# Patient Record
Sex: FEMALE | Race: BLACK OR AFRICAN AMERICAN | Employment: FULL TIME | URBAN - METROPOLITAN AREA
[De-identification: names, ages, dates, MRNs, and addresses within clinical notes are randomized per-mention and may not be internally consistent; named-entity substitution may affect disease eponyms.]

---

## 2021-11-01 ENCOUNTER — OFFICE VISIT (OUTPATIENT)
Dept: SURGERY | Age: 46
End: 2021-11-01
Payer: COMMERCIAL

## 2021-11-01 VITALS
HEART RATE: 76 BPM | BODY MASS INDEX: 28.77 KG/M2 | SYSTOLIC BLOOD PRESSURE: 102 MMHG | OXYGEN SATURATION: 100 % | RESPIRATION RATE: 16 BRPM | HEIGHT: 63 IN | DIASTOLIC BLOOD PRESSURE: 68 MMHG | TEMPERATURE: 97.1 F | WEIGHT: 162.4 LBS

## 2021-11-01 DIAGNOSIS — N62 MACROMASTIA: Primary | ICD-10-CM

## 2021-11-01 PROCEDURE — 99203 OFFICE O/P NEW LOW 30 MIN: CPT | Performed by: PHYSICIAN ASSISTANT

## 2021-11-01 NOTE — PROGRESS NOTES
Department of Plastic Surgery - Adult  Attending Consult Note        CHIEF COMPLAINT:  Symptomatic Macromastia    History Obtained From:  patient    HISTORY OF PRESENT ILLNESS:                The patient is a 42 y.o. female who presents with bilateral symptomatic macromastia. The patient complains of back pain, neck pain, shoulder pain, shoulder grooving, and intertrigo , Intermittent numbness and tingling in bilateral hands and arms. She does admit to having increased headaches over the past 48 months which she associated with her breast symptomatolgy. The patient states that she has been dealing with these associated symptoms , for over 10 years. She is a currently a size 38DDD cup. She has undergone a trial of weight loss, NSAIDS, specialty bras, She states that she has done conservative therapy for  over 12 months. She also admits to feeling as though she has a hunched back from the pull and strain of her breast.  She states that her weight does fluctuate, but to no discernable change in her breast symptomology. She states she has been at a stable weight for greater than 6 months. She is  not a diabetic. The pt states the weight and size of her breasts are effecting her ADLS. She currently works as  for [employer] . The patient states that the weight and size and pull of her breast limits her ability to work to her fullest potential.  She wwalks 2-10 miles every day and she states her breasts limit how much she can work without pain. She states she also is limited to maintaining good hygiene to her bilateral breast inframammary folds, and this has been effecting her for many years. She does use OTC powders and creams for her bilateral breast inframmamary fold rashes, but she states none of these have helped aid in her symptoms. She does have refractory symtoms after using powders and ointments. The pt does  a self breast exam frequently.    The patient  denies any family history of breast cancer. The patient denies any personal history of breast disease. Past Medical History:    Past Medical History:   Diagnosis Date    Known health problems: none     none per pt     Past Surgical History:    Past Surgical History:   Procedure Laterality Date     SECTION       Current Medications:      No current outpatient medications on file. No current facility-administered medications for this visit. Allergies:  Patient has no known allergies. Social History:   Social History     Socioeconomic History    Marital status:      Spouse name: Not on file    Number of children: Not on file    Years of education: Not on file    Highest education level: Not on file   Occupational History    Not on file   Tobacco Use    Smoking status: Never Smoker    Smokeless tobacco: Never Used   Vaping Use    Vaping Use: Never used   Substance and Sexual Activity    Alcohol use: Never    Drug use: Never    Sexual activity: Not on file   Other Topics Concern    Not on file   Social History Narrative    Not on file     Social Determinants of Health     Financial Resource Strain:     Difficulty of Paying Living Expenses:    Food Insecurity:     Worried About Running Out of Food in the Last Year:     [address] in the Last Year:    Transportation Needs:     Lack of Transportation (Medical):      Lack of Transportation (Non-Medical):    Physical Activity:     Days of Exercise per Week:     Minutes of Exercise per Session:    Stress:     Feeling of Stress :    Social Connections:     Frequency of Communication with Friends and Family:     Frequency of Social Gatherings with Friends and Family:     Attends Evangelical Services:     Active Member of Clubs or Organizations:     Attends Club or Organization Meetings:     Marital Status:    Intimate Partner Violence:     Fear of Current or Ex-Partner:     Emotionally Abused:     Physically Abused:     Sexually Abused:      Family History:   Family History   Problem Relation Age of Onset    No Known Problems Mother     No Known Problems Father        REVIEW OF SYSTEMS:    CONSTITUTIONAL:  negative for  fevers, chills, sweats and fatigue  EYES: negative for dipolpia or acute vision loss. RESPIRATORY:  negative for  dry cough, cough with sputum, dyspnea, wheezing and chest pain  CARDIOVASCULAR:  negative for  chest pain, dyspnea, palpitations, syncope  GASTROINTESTINAL:  negative for nausea, vomiting, change in bowel habits, diarrhea, constipation and abdominal pain  EXTREMITIES: negative for edema  MUSCULOSKELETAL: negative for muscle weakness  SKIN: negative for itching or rashes. BEHAVIOR/PSYCH:  negative for poor appetite, increased appetite, decreased sleep and poor concentration  All other review of systems negative    PHYSICAL EXAM:    VITALS:  /68 (Site: Left Upper Arm, Position: Sitting, Cuff Size: Medium Adult)   Pulse 76   Temp 97.1 °F (36.2 °C) (Skin)   Resp 16   Ht 5' 3\" (1.6 m)   Wt 162 lb 6.4 oz (73.7 kg)   SpO2 100%   Breastfeeding No Comment: mirena  BMI 28.77 kg/m²   CONSTITUTIONAL:  awake, alert, cooperative, no apparent distress, and appears stated age  EYES: PERRLA, EOMI, no signs of occular infection  LUNGS:  No increased work of breathing, good air exchange,   CARDIOVASCULAR:   regular rate and rhythm, EXTREMITIES: no signs of clubbing or cyanosis. MUSCULOSKELETAL: negative for flaccid muscle tone or spastic movements. SKIN: gross examination reveals no signs of rashes, or diaphoresis. NEURO: Cranial nerves II-XII grossly intact. No signs of agitated mood. BREAST: Rash is not noted, There are no masses palpated to b/l breast, no axillary lymphadenopathy, no nipple discharge. No breast pain. There are no previous scars.   Bilateral Nipple sensation  intact, lateral inframammary fold hyperhidrosis is appreciated, there is an appearance of hunched shoulders and upper back second to the pool and strain of the patient's large breasts. DATA:    Radiology Review:  Pending/  Breast Measurements were taken and are noted in the media section. IMPRESSION/RECOMMENDATIONS:      Diagnosis  -) Bilateral Macromastia  -) Bilateral Inframammary fold Pruritis  -) Neck Pain/Back Pain    -Breast Measurements obtained and will be scanned into media. Photos were obtained. Chaperone present  -Based on the patients history, ROS, and PE, It is in my medical opinion the patients symptomology is directly correlated to the size, mass and weight of her bilateralbreast tissue. Because this patient has tried and failed conservative treatment for the relief of her symptoms, The patient would benefit from a bilateral breast reduction.     - We will have the patient arrange a release of medical records from her PCP to obtain clinical history.  - Educated the patient on performing self breast exams as patient states she does not. - Discussed with the patient on healthy diet and exercise for continued BMI reduction.   - Screening Mammogram will need reviewed as she had this done last week. Proposed 900 gram removal of the Left breast  Proposed 900 gram removal of the Right breast    The perioperative course was discussed with the patient. The risks and benefits, including but not limited to, bleeding, infection, pain, scarring, asymmetry, loss of tissue, including nipple and areola, unsatisfactory appearance, decreased or increased nipple sensation, poor healing, inability to lactate as well as the need for further surgery were discussed with the patient. She would like to proceed. The risks, benefits and options were discussed with the pt. The risks included but not limited to pain, bleeding, infection, heavy scarring, damage to surrounding structures,  and need for further procedures.  Other risks including but not limited to asymmetry, loss of nipple or/and areola, loss of sensation to nipple and areola, inability to breast feed, seroma, hematoma, implant failure, capsular contracture, and fat necrosis were also discussed with the patient. All of her questions were answered to her satisfaction and she agrees to proceed with the operation.       Follow-up after completion of prior authorization  Lynette Fischer

## 2021-12-27 ENCOUNTER — PREP FOR PROCEDURE (OUTPATIENT)
Dept: SURGERY | Age: 46
End: 2021-12-27

## 2021-12-27 RX ORDER — SODIUM CHLORIDE 0.9 % (FLUSH) 0.9 %
5-40 SYRINGE (ML) INJECTION EVERY 12 HOURS SCHEDULED
Status: CANCELLED | OUTPATIENT
Start: 2021-12-27

## 2021-12-27 RX ORDER — SODIUM CHLORIDE 0.9 % (FLUSH) 0.9 %
5-40 SYRINGE (ML) INJECTION PRN
Status: CANCELLED | OUTPATIENT
Start: 2021-12-27

## 2021-12-27 RX ORDER — SODIUM CHLORIDE 9 MG/ML
25 INJECTION, SOLUTION INTRAVENOUS PRN
Status: CANCELLED | OUTPATIENT
Start: 2021-12-27

## 2021-12-27 NOTE — H&P
Department of Plastic Surgery - Adult  Attending Consult Note           CHIEF COMPLAINT:  Symptomatic Macromastia     History Obtained From:  patient     HISTORY OF PRESENT ILLNESS:                 The patient is a 42 y.o. female who presents with bilateral symptomatic macromastia. The patient complains of back pain, neck pain, shoulder pain, shoulder grooving, and intertrigo , Intermittent numbness and tingling in bilateral hands and arms. She does admit to having increased headaches over the past 48 months which she associated with her breast symptomatolgy. The patient states that she has been dealing with these associated symptoms , for over 10 years. She is a currently a size 38DDD cup. She has undergone a trial of weight loss, NSAIDS, specialty bras, She states that she has done conservative therapy for  over 12 months. She also admits to feeling as though she has a hunched back from the pull and strain of her breast.  She states that her weight does fluctuate, but to no discernable change in her breast symptomology. She states she has been at a stable weight for greater than 6 months. She is  not a diabetic. The pt states the weight and size of her breasts are effecting her ADLS. She currently works as  for [employer] . The patient states that the weight and size and pull of her breast limits her ability to work to her fullest potential.  She wwalks 2-10 miles every day and she states her breasts limit how much she can work without pain. She states she also is limited to maintaining good hygiene to her bilateral breast inframammary folds, and this has been effecting her for many years. She does use OTC powders and creams for her bilateral breast inframmamary fold rashes, but she states none of these have helped aid in her symptoms. She does have refractory symtoms after using powders and ointments. The pt does  a self breast exam frequently.    The patient  denies any family history of breast cancer. The patient denies any personal history of breast disease.     Past Medical History:    Past Medical History        Past Medical History:   Diagnosis Date    Known health problems: none       none per pt         Past Surgical History:    Past Surgical History         Past Surgical History:   Procedure Laterality Date     SECTION             Current Medications:      Current Facility-Administered Medications   No current outpatient medications on file.      No current facility-administered medications for this visit.            Allergies:  Patient has no known allergies.             Social History:   Social History               Socioeconomic History    Marital status:        Spouse name: Not on file    Number of children: Not on file    Years of education: Not on file    Highest education level: Not on file   Occupational History    Not on file   Tobacco Use    Smoking status: Never Smoker    Smokeless tobacco: Never Used   Vaping Use    Vaping Use: Never used   Substance and Sexual Activity    Alcohol use: Never    Drug use: Never    Sexual activity: Not on file   Other Topics Concern    Not on file   Social History Narrative    Not on file      Social Determinants of Health          Financial Resource Strain:     Difficulty of Paying Living Expenses:    Food Insecurity:     Worried About Running Out of Food in the Last Year:     [address] in the Last Year:    Transportation Needs:     Lack of Transportation (Medical):      Lack of Transportation (Non-Medical):    Physical Activity:     Days of Exercise per Week:     Minutes of Exercise per Session:    Stress:     Feeling of Stress :    Social Connections:     Frequency of Communication with Friends and Family:     Frequency of Social Gatherings with Friends and Family:     Attends Gnosticism Services:     Active Member of Clubs or Organizations:     Attends Club or Organization Meetings:  Marital Status:    Intimate Partner Violence:     Fear of Current or Ex-Partner:     Emotionally Abused:     Physically Abused:     Sexually Abused:          Family History:   Family History         Family History   Problem Relation Age of Onset    No Known Problems Mother      No Known Problems Father              REVIEW OF SYSTEMS:    CONSTITUTIONAL:  negative for  fevers, chills, sweats and fatigue  EYES: negative for dipolpia or acute vision loss. RESPIRATORY:  negative for  dry cough, cough with sputum, dyspnea, wheezing and chest pain  CARDIOVASCULAR:  negative for  chest pain, dyspnea, palpitations, syncope  GASTROINTESTINAL:  negative for nausea, vomiting, change in bowel habits, diarrhea, constipation and abdominal pain  EXTREMITIES: negative for edema  MUSCULOSKELETAL: negative for muscle weakness  SKIN: negative for itching or rashes. BEHAVIOR/PSYCH:  negative for poor appetite, increased appetite, decreased sleep and poor concentration  All other review of systems negative     PHYSICAL EXAM:    VITALS:  /68 (Site: Left Upper Arm, Position: Sitting, Cuff Size: Medium Adult)   Pulse 76   Temp 97.1 °F (36.2 °C) (Skin)   Resp 16   Ht 5' 3\" (1.6 m)   Wt 162 lb 6.4 oz (73.7 kg)   SpO2 100%   Breastfeeding No Comment: mirena  BMI 28.77 kg/m²   CONSTITUTIONAL:  awake, alert, cooperative, no apparent distress, and appears stated age  EYES: PERRLA, EOMI, no signs of occular infection  LUNGS:  No increased work of breathing, good air exchange,   CARDIOVASCULAR:   regular rate and rhythm, EXTREMITIES: no signs of clubbing or cyanosis. MUSCULOSKELETAL: negative for flaccid muscle tone or spastic movements. SKIN: gross examination reveals no signs of rashes, or diaphoresis. NEURO: Cranial nerves II-XII grossly intact. No signs of agitated mood.      BREAST: Rash is not noted, There are no masses palpated to b/l breast, no axillary lymphadenopathy, no nipple discharge. No breast pain. There are no previous scars. Bilateral Nipple sensation  intact, lateral inframammary fold hyperhidrosis is appreciated, there is an appearance of hunched shoulders and upper back second to the pool and strain of the patient's large breasts.     DATA:    Radiology Review:  Pending/  Breast Measurements were taken and are noted in the media section.     IMPRESSION/RECOMMENDATIONS:       Diagnosis  -) Bilateral Macromastia  -) Bilateral Inframammary fold Pruritis  -) Neck Pain/Back Pain     -Breast Measurements obtained and will be scanned into media. Photos were obtained. Chaperone present  -Based on the patients history, ROS, and PE, It is in my medical opinion the patients symptomology is directly correlated to the size, mass and weight of her bilateralbreast tissue. Because this patient has tried and failed conservative treatment for the relief of her symptoms, The patient would benefit from a bilateral breast reduction.      - We will have the patient arrange a release of medical records from her PCP to obtain clinical history.  - Educated the patient on performing self breast exams as patient states she does not.      - Discussed with the patient on healthy diet and exercise for continued BMI reduction.   - Screening Mammogram will need reviewed as she had this done last week.        Proposed 900 gram removal of the Left breast  Proposed 900 gram removal of the Right breast     The perioperative course was discussed with the patient. The risks and benefits, including but not limited to, bleeding, infection, pain, scarring, asymmetry, loss of tissue, including nipple and areola, unsatisfactory appearance, decreased or increased nipple sensation, poor healing, inability to lactate as well as the need for further surgery were discussed with the patient. She would like to proceed.      The risks, benefits and options were discussed with the pt.  The risks included but not limited to pain, bleeding, infection, heavy scarring, damage to surrounding structures,  and need for further procedures. Other risks including but not limited to asymmetry, loss of nipple or/and areola, loss of sensation to nipple and areola, inability to breast feed, seroma, hematoma, implant failure, capsular contracture, and fat necrosis were also discussed with the patient. All of her questions were answered to her satisfaction and she agrees to proceed with the operation.

## 2022-01-03 NOTE — PROGRESS NOTES
Subjective: Follow up today from initial presentation for bilateral breast reduction. Denies fever, nausea, vomiting, leg pain or swelling. She presents today to further discuss surgical planning as she is now scheduled for her breast reduction. She voices no changes in her symptomatology since her previous visit with our office. Objective: There were no vitals filed for this visit. VITALS:  /80 (Site: Left Upper Arm, Position: Sitting, Cuff Size: Medium Adult)   Pulse 81   Temp 97.3 °F (36.3 °C) (Temporal)   Ht 5' 3\" (1.6 m)   Wt 157 lb (71.2 kg)   LMP 12/31/2021   SpO2 97%   Breastfeeding No   BMI 27.81 kg/m²   CONSTITUTIONAL:  awake, alert, cooperative, no apparent distress, and appears stated age  EYES: PERRLA, EOMI, no signs of occular infection  MUSCULOSKELETAL: negative for flaccid muscle tone or spastic movements. NEURO: Cranial nerves II-XII grossly intact. No signs of agitated mood. LUNGS:  No increased work of breathing, good air exchange, clear to auscultation bilaterally, no crackles or wheezing  CARDIOVASCULAR:  Normal apical impulse, regular rate and rhythm,   ABDOMEN:  Normal bowel sounds, soft, non-distended, non-tender,     LEFT BREAST: Rash is not noted, There are no masses palpated, no axillary lymphadenopathy, no nipple discharge. No breast pain. There are no previous scars    RIGHT BREAST: Rash is not noted, There are no masses palpated , no axillary lymphadenopathy, no nipple discharge. No breast pain.  There are no previous scars                Assessment:    Past Medical History:   Diagnosis Date    Known health problems: none     none per pt       CBC:   Lab Results   Component Value Date    WBC 6.8 9/19/2012    RBC 3.74 9/19/2012    HGB 10.9 9/19/2012    HCT 33.8 9/19/2012    MCV 90.3 9/19/2012    MCH 29.1 9/19/2012    MCHC 32.2 9/19/2012    RDW 14.4 9/19/2012     9/19/2012    MPV 8.9 9/19/2012     BMP:    Lab Results   Component Value Date  9/19/2012    K 3.6 9/19/2012     9/19/2012    CO2 28 9/19/2012    BUN 10 9/19/2012    CREATININE 0.8 9/19/2012    CALCIUM 8.7 9/19/2012    LABGLOM >60 9/19/2012    GLUCOSE 100 9/19/2012     Hepatic Function Panel:    No results found for this basename: ALKPHOS, ALT, AST, PROT, BILITOT, BILIDIR, IBILI, LABALBU      Patient Active Problem List   Diagnosis    Macromastia       Plan:     -Symptomatic macromastia    I met with the patient today to discuss her preauthorized bilateral breast reduction for symptomatic macromastia. I examined the patient and concur with surgical planning. I furthermore discussed with the patient the preoperative intraoperative postoperative care associated with bilateral breast reduction. The patient understands and was educated that no particular cup size can be guaranteed and that surgical safety is paramount. She had no further questions and would like to proceed as planned.    -The risks, benefits and options were discussed with the pt. The risks included but not limited to pain, bleeding, infection, heavy scarring, damage to surrounding structures,  and need for further procedures. Other risks including but not limited to asymmetry, loss of nipple or/and areola, loss of sensation to nipple and areola, inability to breast feed, seroma, hematoma, implant failure, capsular contracture, and fat necrosis were also discussed with the patient. All of her questions were answered to her satisfaction and she agrees to proceed with the operation. The visit lasted greater than 15 minutes, with 50% of the time in counseling, education, review of the case, and coordination of care. F/U day of surgery    Call office with concerns or signs of infection. This document is generated, in part, by voice recognition software and thus  syntax and grammatical errors are possible.     Rohan Moreno MD  11:07 AM  1/7/2022

## 2022-01-06 ENCOUNTER — TELEPHONE (OUTPATIENT)
Dept: SURGERY | Age: 47
End: 2022-01-06

## 2022-01-06 NOTE — TELEPHONE ENCOUNTER
Meet with dr. Pilar Biggs 1/7/2022      Bilateral breast reduction  Surgery has been scheduled at [facility] [id number] [place], [place] on 1/20/22(new date    (changed date from 1/25/22), Pre-Admission Testing will call you prior to surgery to inform you arrival time and any other additional directions,if they are unable to reach you,please call them two days prior at [phone]. If taking Fish Oil, Vitamins, two weeks prior to surgery stop taking. If taking NSAIDS (such as Aspirin, Ibuprofen) anticoagulants please consult with your prescribing physician to get further instructions on when to stop medication prior to surgery that is scheduled, patient understood. Pre-Auth #:[id number]  CPT Codes: 20696  ICD 10:n62      Phone discussion was had with the patient today regarding upcoming surgery. I had a discussion with the patient that although the patient's insurance company has approved the procedure proposed, there is no guarantee of payment by [employer] on their final review following the procedure. This is also reflected in the letter received by this office and the patient from the insurance company. The patient has voiced to me complete understanding of this scenario, understands that they will be responsible for their individual deductable/coinsurance balance as an out-of-pocket expense and possibly the financial responsibility of the surgical procedure if the patient's insurance company elects not to pay for certain or all services. The patient elects to proceed with the proposed surgical plan.

## 2022-01-07 ENCOUNTER — OFFICE VISIT (OUTPATIENT)
Dept: SURGERY | Age: 47
End: 2022-01-07
Payer: COMMERCIAL

## 2022-01-07 VITALS
SYSTOLIC BLOOD PRESSURE: 102 MMHG | DIASTOLIC BLOOD PRESSURE: 80 MMHG | HEIGHT: 63 IN | OXYGEN SATURATION: 97 % | WEIGHT: 157 LBS | BODY MASS INDEX: 27.82 KG/M2 | TEMPERATURE: 97.3 F | HEART RATE: 81 BPM

## 2022-01-07 DIAGNOSIS — N62 MACROMASTIA: Primary | ICD-10-CM

## 2022-01-07 PROCEDURE — 99213 OFFICE O/P EST LOW 20 MIN: CPT | Performed by: PLASTIC SURGERY

## 2022-01-14 NOTE — PROGRESS NOTES
Ar 38 PRE-ADMISSION TESTING GENERAL INSTRUCTIONS- [facility]-phone number:[phone]    GENERAL INSTRUCTIONS  [x] Antibacterial Soap shower Night before and/or AM of Surgery  [] Ricardo wipe instruction sheet and wipes given. [x] Nothing by mouth after midnight, including gum, candy, mints, or water. [x] You may brush your teeth, gargle, but do NOT swallow water. []Hibiclens shower  the night before and the morning of surgery. Do not use             Hibiclens on your face or head. [x]No smoking, chewing tobacco, illegal drugs, or alcohol within 24 hours of your surgery. [x] Jewelry, valuables or body piercing's should not be brought to the hospital. All body and/or tongue piercing's must be removed prior to arriving to hospital.  ALL hair pins must be removed. [x] Do not wear makeup, lotions, powders, deodorant. Nail polish as directed by the nurse. [x] Arrange transportation with a responsible adult  to and from the hospital. If you do not have a responsible adult  to transport you, you will need to make arrangements with a medical transportation company (i.e. [employer]. A Uber/taxi/bus is not appropriate unless you are accompanied by a responsible adult ). Arrange for someone to be with you for the remainder of the day and for 24 hours after your procedure due to having had anesthesia. Who will be your  for transportation?__family________________   Who will be staying with you for 24 hrs after your procedure?___family_______________  [x] Bring insurance card and photo ID.  [] Transfusion Bracelet: Please bring with you to hospital, day of surgery  [x] Bring urine specimen day of surgery. Any small container is acceptable. [] Use inhalers the morning of surgery and bring with you to hospital.  [] Bring copy of living will or healthcare power of  papers to be placed in your electronic record.   [] CPAP/BI-PAP: Please bring your machine if you are to spend the night in the hospital.     PARKING INSTRUCTIONS:   [x] Arrival Time: 0730, wear mask  · [x] Parking lot '\"I\"  is located on [facility] (the corner of [facility] and [facility]). To enter, press the button and the gate will lift. A free token will be provided to exit the lot. One car per patient is allowed to park in this lot. All other cars are to park on [address] [place] [address] either in the parking garage or the handicap lot. [] To reach the [facility] lobby from [address] [place] [address], upon entering the hospital, take elevator B to the 3rd floor. EDUCATION INSTRUCTIONS:      [] Knee or hip replacement booklet & exercise pamphlets given. [] Juliano Lynch placed in chart. [] Pre-admission Testing educational folder given  [] Incentive Spirometry,coughing & deep breathing exercises reviewed. []Medication information sheet(s)   []Fluoroscopy-Xray used in surgery reviewed with patient. Educational pamphlet placed in chart. [x]Pain: Post-op pain is normal and to be expected. You will be asked to rate your pain from 0-10(a zero is not acceptable-education is needed). Your post-op pain goal is:  [x] Ask your nurse for your pain medication. [] Joint camp offered. [] Joint replacement booklets given. [] Other:___________________________    MEDICATION INSTRUCTIONS:   []Bring a complete list of your medications, please write the last time you took the medicine, give this list to the nurse.   [] Take the following medications the morning of surgery with 1-2 ounces of water:   [x] Stop herbal supplements and vitamins 5 days before your surgery. [] DO NOT take any diabetic medicine the morning of surgery. Follow instructions for insulin the day before surgery. [] If you are diabetic and your blood sugar is low or you feel symptomatic, you may drink 1-2 ounces of apple juice or take a glucose tablet.   The morning of your procedure, you may call the pre-op area if you have concerns about your blood sugar [phone]. [] Use your inhalers the morning of surgery. Bring your emergency inhaler with you day of surgery. [] Follow physician instructions regarding any blood thinners you may be taking. WHAT TO EXPECT:  [x] The day of surgery you will be greeted and checked in by the [employer].  In addition, you will be registered in the [place] by a Patient Access Representative. Please bring your photo ID and insurance card. A nurse will greet you in accordance to the time you are needed in the pre-op area to prepare you for surgery. Please do not be discouraged if you are not greeted in the order you arrive as there are many variables that are involved in patient preparation. Your patience is greatly appreciated as you wait for your nurse. Please bring in items such as: books, magazines, newspapers, electronics, or any other items  to occupy your time in the waiting area. [x]  Delays may occur with surgery and staff will make a sincere effort to keep you informed of delays. If any delays occur with your procedure, we apologize ahead of time for your inconvenience as we recognize the value of your time.

## 2022-01-19 ENCOUNTER — ANESTHESIA EVENT (OUTPATIENT)
Dept: OPERATING ROOM | Age: 47
End: 2022-01-19
Payer: COMMERCIAL

## 2022-01-20 ENCOUNTER — ANESTHESIA (OUTPATIENT)
Dept: OPERATING ROOM | Age: 47
End: 2022-01-20
Payer: COMMERCIAL

## 2022-01-20 ENCOUNTER — HOSPITAL ENCOUNTER (OUTPATIENT)
Age: 47
Setting detail: OUTPATIENT SURGERY
Discharge: HOME | End: 2022-01-20
Attending: PLASTIC SURGERY | Admitting: PLASTIC SURGERY
Payer: COMMERCIAL

## 2022-01-20 VITALS
OXYGEN SATURATION: 93 % | DIASTOLIC BLOOD PRESSURE: 89 MMHG | SYSTOLIC BLOOD PRESSURE: 124 MMHG | HEART RATE: 71 BPM | RESPIRATION RATE: 16 BRPM | WEIGHT: 157 LBS | TEMPERATURE: 97.4 F | BODY MASS INDEX: 27.82 KG/M2 | HEIGHT: 63 IN

## 2022-01-20 VITALS
OXYGEN SATURATION: 100 % | SYSTOLIC BLOOD PRESSURE: 132 MMHG | DIASTOLIC BLOOD PRESSURE: 84 MMHG | RESPIRATION RATE: 8 BRPM

## 2022-01-20 DIAGNOSIS — Z01.812 PRE-OPERATIVE LABORATORY EXAMINATION: Primary | ICD-10-CM

## 2022-01-20 DIAGNOSIS — G89.18 POST-OP PAIN: ICD-10-CM

## 2022-01-20 LAB
HCG, URINE, POC: NEGATIVE
Lab: NORMAL
NEGATIVE QC PASS/FAIL: NORMAL
POSITIVE QC PASS/FAIL: NORMAL

## 2022-01-20 PROCEDURE — 7100000010 HC PHASE II RECOVERY - FIRST 15 MIN: Performed by: PLASTIC SURGERY

## 2022-01-20 PROCEDURE — 3700000001 HC ADD 15 MINUTES (ANESTHESIA): Performed by: PLASTIC SURGERY

## 2022-01-20 PROCEDURE — 6360000002 HC RX W HCPCS: Performed by: ANESTHESIOLOGY

## 2022-01-20 PROCEDURE — 3600000003 HC SURGERY LEVEL 3 BASE: Performed by: PLASTIC SURGERY

## 2022-01-20 PROCEDURE — 6370000000 HC RX 637 (ALT 250 FOR IP): Performed by: ANESTHESIOLOGY

## 2022-01-20 PROCEDURE — 2580000003 HC RX 258: Performed by: PHYSICIAN ASSISTANT

## 2022-01-20 PROCEDURE — 2500000003 HC RX 250 WO HCPCS: Performed by: PLASTIC SURGERY

## 2022-01-20 PROCEDURE — 2709999900 HC NON-CHARGEABLE SUPPLY: Performed by: PLASTIC SURGERY

## 2022-01-20 PROCEDURE — C1713 ANCHOR/SCREW BN/BN,TIS/BN: HCPCS | Performed by: PLASTIC SURGERY

## 2022-01-20 PROCEDURE — 3600000013 HC SURGERY LEVEL 3 ADDTL 15MIN: Performed by: PLASTIC SURGERY

## 2022-01-20 PROCEDURE — 19318 BREAST REDUCTION: CPT | Performed by: PLASTIC SURGERY

## 2022-01-20 PROCEDURE — 7100000000 HC PACU RECOVERY - FIRST 15 MIN: Performed by: PLASTIC SURGERY

## 2022-01-20 PROCEDURE — 19318 BREAST REDUCTION: CPT | Performed by: PHYSICIAN ASSISTANT

## 2022-01-20 PROCEDURE — 7100000001 HC PACU RECOVERY - ADDTL 15 MIN: Performed by: PLASTIC SURGERY

## 2022-01-20 PROCEDURE — 2780000010 HC IMPLANT OTHER: Performed by: PLASTIC SURGERY

## 2022-01-20 PROCEDURE — 3700000000 HC ANESTHESIA ATTENDED CARE: Performed by: PLASTIC SURGERY

## 2022-01-20 PROCEDURE — 2580000003 HC RX 258: Performed by: PLASTIC SURGERY

## 2022-01-20 PROCEDURE — 7100000011 HC PHASE II RECOVERY - ADDTL 15 MIN: Performed by: PLASTIC SURGERY

## 2022-01-20 PROCEDURE — 6360000002 HC RX W HCPCS: Performed by: PHYSICIAN ASSISTANT

## 2022-01-20 PROCEDURE — 6360000002 HC RX W HCPCS: Performed by: PLASTIC SURGERY

## 2022-01-20 PROCEDURE — 88305 TISSUE EXAM BY PATHOLOGIST: CPT

## 2022-01-20 PROCEDURE — 6360000002 HC RX W HCPCS

## 2022-01-20 PROCEDURE — 2500000003 HC RX 250 WO HCPCS

## 2022-01-20 PROCEDURE — 2720000010 HC SURG SUPPLY STERILE: Performed by: PLASTIC SURGERY

## 2022-01-20 RX ORDER — GLYCOPYRROLATE 1 MG/5 ML
SYRINGE (ML) INTRAVENOUS PRN
Status: DISCONTINUED | OUTPATIENT
Start: 2022-01-20 | End: 2022-01-20 | Stop reason: SDUPTHER

## 2022-01-20 RX ORDER — ONDANSETRON 2 MG/ML
INJECTION INTRAMUSCULAR; INTRAVENOUS PRN
Status: DISCONTINUED | OUTPATIENT
Start: 2022-01-20 | End: 2022-01-20 | Stop reason: SDUPTHER

## 2022-01-20 RX ORDER — FENTANYL CITRATE 50 UG/ML
INJECTION, SOLUTION INTRAMUSCULAR; INTRAVENOUS PRN
Status: DISCONTINUED | OUTPATIENT
Start: 2022-01-20 | End: 2022-01-20 | Stop reason: SDUPTHER

## 2022-01-20 RX ORDER — SODIUM CHLORIDE 9 MG/ML
25 INJECTION, SOLUTION INTRAVENOUS PRN
Status: DISCONTINUED | OUTPATIENT
Start: 2022-01-20 | End: 2022-01-20 | Stop reason: HOSPADM

## 2022-01-20 RX ORDER — HYDROCODONE BITARTRATE AND ACETAMINOPHEN 5; 325 MG/1; MG/1
1 TABLET ORAL PRN
Status: COMPLETED | OUTPATIENT
Start: 2022-01-20 | End: 2022-01-20

## 2022-01-20 RX ORDER — ONDANSETRON 2 MG/ML
4 INJECTION INTRAMUSCULAR; INTRAVENOUS
Status: DISCONTINUED | OUTPATIENT
Start: 2022-01-20 | End: 2022-01-20 | Stop reason: HOSPADM

## 2022-01-20 RX ORDER — SODIUM CHLORIDE 0.9 % (FLUSH) 0.9 %
5-40 SYRINGE (ML) INJECTION EVERY 12 HOURS SCHEDULED
Status: DISCONTINUED | OUTPATIENT
Start: 2022-01-20 | End: 2022-01-20 | Stop reason: HOSPADM

## 2022-01-20 RX ORDER — BUPIVACAINE HYDROCHLORIDE AND EPINEPHRINE 2.5; 5 MG/ML; UG/ML
INJECTION, SOLUTION EPIDURAL; INFILTRATION; INTRACAUDAL; PERINEURAL PRN
Status: DISCONTINUED | OUTPATIENT
Start: 2022-01-20 | End: 2022-01-20 | Stop reason: ALTCHOICE

## 2022-01-20 RX ORDER — ROCURONIUM BROMIDE 10 MG/ML
INJECTION, SOLUTION INTRAVENOUS PRN
Status: DISCONTINUED | OUTPATIENT
Start: 2022-01-20 | End: 2022-01-20 | Stop reason: SDUPTHER

## 2022-01-20 RX ORDER — HYDROCODONE BITARTRATE AND ACETAMINOPHEN 5; 325 MG/1; MG/1
2 TABLET ORAL PRN
Status: COMPLETED | OUTPATIENT
Start: 2022-01-20 | End: 2022-01-20

## 2022-01-20 RX ORDER — NEOSTIGMINE METHYLSULFATE 1 MG/ML
INJECTION, SOLUTION INTRAVENOUS PRN
Status: DISCONTINUED | OUTPATIENT
Start: 2022-01-20 | End: 2022-01-20 | Stop reason: SDUPTHER

## 2022-01-20 RX ORDER — ONDANSETRON 4 MG/1
4 TABLET, FILM COATED ORAL DAILY PRN
Qty: 12 TABLET | Refills: 1 | Status: SHIPPED | OUTPATIENT
Start: 2022-01-20

## 2022-01-20 RX ORDER — SODIUM CHLORIDE 0.9 % (FLUSH) 0.9 %
5-40 SYRINGE (ML) INJECTION PRN
Status: DISCONTINUED | OUTPATIENT
Start: 2022-01-20 | End: 2022-01-20 | Stop reason: HOSPADM

## 2022-01-20 RX ORDER — OXYCODONE HYDROCHLORIDE AND ACETAMINOPHEN 5; 325 MG/1; MG/1
1 TABLET ORAL EVERY 6 HOURS PRN
Qty: 15 TABLET | Refills: 0 | Status: SHIPPED | OUTPATIENT
Start: 2022-01-20 | End: 2022-01-27

## 2022-01-20 RX ORDER — LIDOCAINE HYDROCHLORIDE 20 MG/ML
INJECTION, SOLUTION INTRAVENOUS PRN
Status: DISCONTINUED | OUTPATIENT
Start: 2022-01-20 | End: 2022-01-20 | Stop reason: SDUPTHER

## 2022-01-20 RX ORDER — PROPOFOL 10 MG/ML
INJECTION, EMULSION INTRAVENOUS PRN
Status: DISCONTINUED | OUTPATIENT
Start: 2022-01-20 | End: 2022-01-20 | Stop reason: SDUPTHER

## 2022-01-20 RX ORDER — CEPHALEXIN 500 MG/1
500 CAPSULE ORAL 4 TIMES DAILY
Qty: 20 CAPSULE | Refills: 0 | Status: SHIPPED | OUTPATIENT
Start: 2022-01-20 | End: 2022-01-25

## 2022-01-20 RX ORDER — MIDAZOLAM HYDROCHLORIDE 1 MG/ML
INJECTION INTRAMUSCULAR; INTRAVENOUS PRN
Status: DISCONTINUED | OUTPATIENT
Start: 2022-01-20 | End: 2022-01-20 | Stop reason: SDUPTHER

## 2022-01-20 RX ORDER — DEXAMETHASONE SODIUM PHOSPHATE 10 MG/ML
INJECTION INTRAMUSCULAR; INTRAVENOUS PRN
Status: DISCONTINUED | OUTPATIENT
Start: 2022-01-20 | End: 2022-01-20 | Stop reason: SDUPTHER

## 2022-01-20 RX ORDER — FENTANYL CITRATE 50 UG/ML
25 INJECTION, SOLUTION INTRAMUSCULAR; INTRAVENOUS EVERY 5 MIN PRN
Status: DISCONTINUED | OUTPATIENT
Start: 2022-01-20 | End: 2022-01-20 | Stop reason: HOSPADM

## 2022-01-20 RX ADMIN — ONDANSETRON 4 MG: 2 INJECTION INTRAMUSCULAR; INTRAVENOUS at 11:38

## 2022-01-20 RX ADMIN — MIDAZOLAM 2 MG: 1 INJECTION INTRAMUSCULAR; INTRAVENOUS at 10:30

## 2022-01-20 RX ADMIN — FENTANYL CITRATE 25 MCG: 50 INJECTION, SOLUTION INTRAMUSCULAR; INTRAVENOUS at 12:00

## 2022-01-20 RX ADMIN — SODIUM CHLORIDE: 9 INJECTION, SOLUTION INTRAVENOUS at 10:31

## 2022-01-20 RX ADMIN — LIDOCAINE HYDROCHLORIDE 100 MG: 20 INJECTION, SOLUTION INTRAVENOUS at 10:37

## 2022-01-20 RX ADMIN — ROCURONIUM BROMIDE 40 MG: 10 SOLUTION INTRAVENOUS at 10:37

## 2022-01-20 RX ADMIN — HYDROMORPHONE HYDROCHLORIDE 0.5 MG: 1 INJECTION, SOLUTION INTRAMUSCULAR; INTRAVENOUS; SUBCUTANEOUS at 12:56

## 2022-01-20 RX ADMIN — FENTANYL CITRATE 25 MCG: 50 INJECTION, SOLUTION INTRAMUSCULAR; INTRAVENOUS at 11:56

## 2022-01-20 RX ADMIN — Medication 2000 MG: at 10:42

## 2022-01-20 RX ADMIN — FENTANYL CITRATE 100 MCG: 50 INJECTION, SOLUTION INTRAMUSCULAR; INTRAVENOUS at 10:37

## 2022-01-20 RX ADMIN — Medication 0.6 MG: at 11:49

## 2022-01-20 RX ADMIN — Medication 3 MG: at 11:49

## 2022-01-20 RX ADMIN — PROPOFOL 200 MG: 10 INJECTION, EMULSION INTRAVENOUS at 10:37

## 2022-01-20 RX ADMIN — FENTANYL CITRATE 50 MCG: 50 INJECTION, SOLUTION INTRAMUSCULAR; INTRAVENOUS at 11:05

## 2022-01-20 RX ADMIN — DEXAMETHASONE SODIUM PHOSPHATE 10 MG: 10 INJECTION INTRAMUSCULAR; INTRAVENOUS at 10:40

## 2022-01-20 RX ADMIN — FENTANYL CITRATE 50 MCG: 50 INJECTION, SOLUTION INTRAMUSCULAR; INTRAVENOUS at 10:45

## 2022-01-20 RX ADMIN — HYDROCODONE BITARTRATE AND ACETAMINOPHEN 2 TABLET: 5; 325 TABLET ORAL at 14:09

## 2022-01-20 ASSESSMENT — PULMONARY FUNCTION TESTS
PIF_VALUE: 16
PIF_VALUE: 16
PIF_VALUE: 15
PIF_VALUE: 17
PIF_VALUE: 16
PIF_VALUE: 17
PIF_VALUE: 20
PIF_VALUE: 17
PIF_VALUE: 17
PIF_VALUE: 16
PIF_VALUE: 12
PIF_VALUE: 12
PIF_VALUE: 6
PIF_VALUE: 16
PIF_VALUE: 2
PIF_VALUE: 16
PIF_VALUE: 17
PIF_VALUE: 16
PIF_VALUE: 18
PIF_VALUE: 10
PIF_VALUE: 15
PIF_VALUE: 17
PIF_VALUE: 17
PIF_VALUE: 12
PIF_VALUE: 15
PIF_VALUE: 10
PIF_VALUE: 16
PIF_VALUE: 14
PIF_VALUE: 1
PIF_VALUE: 1
PIF_VALUE: 3
PIF_VALUE: 17
PIF_VALUE: 3
PIF_VALUE: 2
PIF_VALUE: 19
PIF_VALUE: 19
PIF_VALUE: 3
PIF_VALUE: 20
PIF_VALUE: 1
PIF_VALUE: 2
PIF_VALUE: 17
PIF_VALUE: 17
PIF_VALUE: 8
PIF_VALUE: 16
PIF_VALUE: 3
PIF_VALUE: 15
PIF_VALUE: 18
PIF_VALUE: 18
PIF_VALUE: 10
PIF_VALUE: 15
PIF_VALUE: 16
PIF_VALUE: 16
PIF_VALUE: 17
PIF_VALUE: 16
PIF_VALUE: 17
PIF_VALUE: 17
PIF_VALUE: 16
PIF_VALUE: 12
PIF_VALUE: 12
PIF_VALUE: 18
PIF_VALUE: 18
PIF_VALUE: 19
PIF_VALUE: 16
PIF_VALUE: 10
PIF_VALUE: 19
PIF_VALUE: 15
PIF_VALUE: 15
PIF_VALUE: 16
PIF_VALUE: 17
PIF_VALUE: 17
PIF_VALUE: 18
PIF_VALUE: 15
PIF_VALUE: 11
PIF_VALUE: 16
PIF_VALUE: 16
PIF_VALUE: 17
PIF_VALUE: 12
PIF_VALUE: 17
PIF_VALUE: 17
PIF_VALUE: 15
PIF_VALUE: 16
PIF_VALUE: 15
PIF_VALUE: 16
PIF_VALUE: 16
PIF_VALUE: 15
PIF_VALUE: 3
PIF_VALUE: 15
PIF_VALUE: 12
PIF_VALUE: 16
PIF_VALUE: 15
PIF_VALUE: 16
PIF_VALUE: 16
PIF_VALUE: 17
PIF_VALUE: 12
PIF_VALUE: 15

## 2022-01-20 ASSESSMENT — PAIN DESCRIPTION - DESCRIPTORS
DESCRIPTORS: ACHING;CONSTANT;DISCOMFORT
DESCRIPTORS: DISCOMFORT;ACHING;SORE

## 2022-01-20 ASSESSMENT — PAIN SCALES - GENERAL
PAINLEVEL_OUTOF10: 7

## 2022-01-20 ASSESSMENT — PAIN DESCRIPTION - ORIENTATION
ORIENTATION: RIGHT;LEFT
ORIENTATION: RIGHT;LEFT

## 2022-01-20 ASSESSMENT — PAIN DESCRIPTION - LOCATION
LOCATION: BREAST
LOCATION: BREAST

## 2022-01-20 ASSESSMENT — PAIN - FUNCTIONAL ASSESSMENT: PAIN_FUNCTIONAL_ASSESSMENT: 0-10

## 2022-01-20 ASSESSMENT — PAIN DESCRIPTION - FREQUENCY: FREQUENCY: CONTINUOUS

## 2022-01-20 ASSESSMENT — PAIN DESCRIPTION - PROGRESSION: CLINICAL_PROGRESSION: GRADUALLY IMPROVING

## 2022-01-20 ASSESSMENT — PAIN DESCRIPTION - PAIN TYPE: TYPE: SURGICAL PAIN;ACUTE PAIN

## 2022-01-20 ASSESSMENT — PAIN DESCRIPTION - ONSET: ONSET: GRADUAL

## 2022-01-20 NOTE — ANESTHESIA PRE PROCEDURE
Department of Anesthesiology  Preprocedure Note       Name:  Shannan Abdi   Age:  42 y.o.  :  1975                                          MRN:  [id number]         Date:  2022      Surgeon: Daniella Inman):  Deepti Klein MD    Procedure: Procedure(s):  BILATERAL BREAST REDUCTION    Medications prior to admission:   Prior to Admission medications    Medication Sig Start Date End Date Taking? Authorizing Provider   oxyCODONE-acetaminophen (PERCOCET) 5-325 MG per tablet Take 1 tablet by mouth every 6 hours as needed for Pain for up to 7 days.  22 Yes EDWIN Moreno   cephALEXin (KEFLEX) 500 MG capsule Take 1 capsule by mouth 4 times daily for 5 days 22 Yes EDWIN Moreno   ondansetron [facility]) 4 MG tablet Take 1 tablet by mouth daily as needed for Nausea or Vomiting 22  Yes EDWIN Moreno       Current medications:    Current Facility-Administered Medications   Medication Dose Route Frequency Provider Last Rate Last Admin    0.9 % sodium chloride infusion  25 mL IntraVENous PRN EDWIN Moreno        ceFAZolin (ANCEF) 2000 mg in sterile water 20 mL IV syringe  2,000 mg IntraVENous On Call to [address] [place] [address]        sodium chloride flush 0.9 % injection 5-40 mL  5-40 mL IntraVENous 2 times per day EDWIN Moreno        sodium chloride flush 0.9 % injection 5-40 mL  5-40 mL IntraVENous PRN EDWIN Moreno           Allergies:  No Known Allergies    Problem List:    Patient Active Problem List   Diagnosis Code    Pre-operative laboratory examination Z01.812       Past Medical History:        Diagnosis Date    Known health problems: none     none per pt       Past Surgical History:        Procedure Laterality Date     SECTION         Social History:    Social History     Tobacco Use    Smoking status: Never Smoker    Smokeless tobacco: Never Used   Substance Use Topics    Alcohol use: Never Counseling given: Not Answered      Vital Signs (Current):   Vitals:    01/14/22 1502 01/20/22 0749   BP:  118/66   Pulse:  88   Resp:  16   Temp:  97.1 °F (36.2 °C)   TempSrc:  Temporal   SpO2:  96%   Weight: 157 lb (71.2 kg) 157 lb (71.2 kg)   Height:  5' 3\" (1.6 m)                                              BP Readings from Last 3 Encounters:   01/20/22 118/66   01/07/22 102/80   11/01/21 102/68       NPO Status: Time of last liquid consumption: 2100                        Time of last solid consumption: 2150                        Date of last liquid consumption: 01/19/22                        Date of last solid food consumption: 01/19/22    BMI:   Wt Readings from Last 3 Encounters:   01/20/22 157 lb (71.2 kg)   01/07/22 157 lb (71.2 kg)   11/01/21 162 lb 6.4 oz (73.7 kg)     Body mass index is 27.81 kg/m². CBC: No results found for: WBC, RBC, HGB, HCT, MCV, RDW, PLT    CMP: No results found for: NA, K, CL, CO2, BUN, CREATININE, GFRAA, AGRATIO, LABGLOM, GLUCOSE, GLU, PROT, CALCIUM, BILITOT, ALKPHOS, AST, ALT    POC Tests: No results for input(s): POCGLU, POCNA, POCK, POCCL, POCBUN, POCHEMO, POCHCT in the last 72 hours.     Coags: No results found for: PROTIME, INR, APTT    HCG (If Applicable): No results found for: PREGTESTUR, PREGSERUM, HCG, HCGQUANT     ABGs: No results found for: PHART, PO2ART, JPN4MGF, YHE9FRY, BEART, P6PBKKOA     Type & Screen (If Applicable):  No results found for: LABABO, LABRH    Drug/Infectious Status (If Applicable):  No results found for: HIV, HEPCAB    COVID-19 Screening (If Applicable): No results found for: COVID19        Anesthesia Evaluation    Airway: Mallampati: II  TM distance: >3 FB   Neck ROM: full  Mouth opening: > = 3 FB Dental:          Pulmonary: breath sounds clear to auscultation                             Cardiovascular:            Rhythm: regular                      Neuro/Psych:               GI/Hepatic/Renal:             Endo/Other: Abdominal:             Vascular: Other Findings:             Anesthesia Plan      general     ASA 2       Induction: intravenous. MIPS: Postoperative opioids intended and Prophylactic antiemetics administered. Anesthetic plan and risks discussed with patient. Plan discussed with CRNA.                   Marilin Garcia MD   1/20/2022

## 2022-01-20 NOTE — ANESTHESIA POSTPROCEDURE EVALUATION
Department of Anesthesiology  Postprocedure Note    Patient: Ligia Waterman  MRN: [id number]  YOB: 1975  Date of evaluation: 1/20/2022  Time:  12:52 PM     Procedure Summary     Date: 01/20/22 Room / Location: [facility] OR 10 / [facility]    Anesthesia Start: 1027 Anesthesia Stop: 3781    Procedure: BILATERAL BREAST REDUCTION (Bilateral Breast) Diagnosis: (SYMPTOMATIC MACRO MASTEA BILATERAL)    Surgeons: Mariana Chicas MD Responsible Provider: Janet Mtz MD    Anesthesia Type: general ASA Status: 2          Anesthesia Type: general    Radha Phase I: Radha Score: 8    Radha Phase II:      Last vitals: Reviewed and per EMR flowsheets.        Anesthesia Post Evaluation    Patient location during evaluation: PACU  Patient participation: complete - patient participated  Level of consciousness: awake  Pain score: 0  Airway patency: patent  Nausea & Vomiting: no nausea  Complications: no  Cardiovascular status: hemodynamically stable  Respiratory status: acceptable  Hydration status: stable

## 2022-01-20 NOTE — H&P
Department of Plastic Surgery - Adult  Attending Consult Note           CHIEF COMPLAINT:  Symptomatic Macromastia     History Obtained From:  patient     HISTORY OF PRESENT ILLNESS:                 The patient is a 42 y.o. female who presents with bilateral symptomatic macromastia. The patient complains of back pain, neck pain, shoulder pain, shoulder grooving, and intertrigo , Intermittent numbness and tingling in bilateral hands and arms. She does admit to having increased headaches over the past 48 months which she associated with her breast symptomatolgy. The patient states that she has been dealing with these associated symptoms , for over 10 years. She is a currently a size 38DDD cup. She has undergone a trial of weight loss, NSAIDS, specialty bras, She states that she has done conservative therapy for  over 12 months. She also admits to feeling as though she has a hunched back from the pull and strain of her breast.  She states that her weight does fluctuate, but to no discernable change in her breast symptomology. She states she has been at a stable weight for greater than 6 months. She is  not a diabetic. The pt states the weight and size of her breasts are effecting her ADLS. She currently works as  for [employer] . The patient states that the weight and size and pull of her breast limits her ability to work to her fullest potential.  She wwalks 2-10 miles every day and she states her breasts limit how much she can work without pain. She states she also is limited to maintaining good hygiene to her bilateral breast inframammary folds, and this has been effecting her for many years. She does use OTC powders and creams for her bilateral breast inframmamary fold rashes, but she states none of these have helped aid in her symptoms. She does have refractory symtoms after using powders and ointments. The pt does  a self breast exam frequently.    The patient  denies any family history of breast cancer. The patient denies any personal history of breast disease.     Past Medical History:    Past Medical History        Past Medical History:   Diagnosis Date    Known health problems: none       none per pt         Past Surgical History:    Past Surgical History         Past Surgical History:   Procedure Laterality Date     SECTION             Current Medications:      Current Facility-Administered Medications   No current outpatient medications on file.      No current facility-administered medications for this visit.            Allergies:  Patient has no known allergies.             Social History:   Social History               Socioeconomic History    Marital status:        Spouse name: Not on file    Number of children: Not on file    Years of education: Not on file    Highest education level: Not on file   Occupational History    Not on file   Tobacco Use    Smoking status: Never Smoker    Smokeless tobacco: Never Used   Vaping Use    Vaping Use: Never used   Substance and Sexual Activity    Alcohol use: Never    Drug use: Never    Sexual activity: Not on file   Other Topics Concern    Not on file   Social History Narrative    Not on file      Social Determinants of Health          Financial Resource Strain:     Difficulty of Paying Living Expenses:    Food Insecurity:     Worried About Running Out of Food in the Last Year:     [address] in the Last Year:    Transportation Needs:     Lack of Transportation (Medical):      Lack of Transportation (Non-Medical):    Physical Activity:     Days of Exercise per Week:     Minutes of Exercise per Session:    Stress:     Feeling of Stress :    Social Connections:     Frequency of Communication with Friends and Family:     Frequency of Social Gatherings with Friends and Family:     Attends Jainism Services:     Active Member of Clubs or Organizations:     Attends Club or Organization Meetings:  Marital Status:    Intimate Partner Violence:     Fear of Current or Ex-Partner:     Emotionally Abused:     Physically Abused:     Sexually Abused:          Family History:   Family History         Family History   Problem Relation Age of Onset    No Known Problems Mother      No Known Problems Father              REVIEW OF SYSTEMS:    CONSTITUTIONAL:  negative for  fevers, chills, sweats and fatigue  EYES: negative for dipolpia or acute vision loss. RESPIRATORY:  negative for  dry cough, cough with sputum, dyspnea, wheezing and chest pain  CARDIOVASCULAR:  negative for  chest pain, dyspnea, palpitations, syncope  GASTROINTESTINAL:  negative for nausea, vomiting, change in bowel habits, diarrhea, constipation and abdominal pain  EXTREMITIES: negative for edema  MUSCULOSKELETAL: negative for muscle weakness  SKIN: negative for itching or rashes. BEHAVIOR/PSYCH:  negative for poor appetite, increased appetite, decreased sleep and poor concentration  All other review of systems negative     PHYSICAL EXAM:    VITALS:  /68 (Site: Left Upper Arm, Position: Sitting, Cuff Size: Medium Adult)   Pulse 76   Temp 97.1 °F (36.2 °C) (Skin)   Resp 16   Ht 5' 3\" (1.6 m)   Wt 162 lb 6.4 oz (73.7 kg)   SpO2 100%   Breastfeeding No Comment: mirena  BMI 28.77 kg/m²   CONSTITUTIONAL:  awake, alert, cooperative, no apparent distress, and appears stated age  EYES: PERRLA, EOMI, no signs of occular infection  LUNGS:  No increased work of breathing, good air exchange,   CARDIOVASCULAR:   regular rate and rhythm, EXTREMITIES: no signs of clubbing or cyanosis. MUSCULOSKELETAL: negative for flaccid muscle tone or spastic movements. SKIN: gross examination reveals no signs of rashes, or diaphoresis. NEURO: Cranial nerves II-XII grossly intact. No signs of agitated mood.      BREAST: Rash is not noted, There are no masses palpated to b/l breast, no axillary lymphadenopathy, no nipple discharge. No breast pain. There are no previous scars. Bilateral Nipple sensation  intact, lateral inframammary fold hyperhidrosis is appreciated, there is an appearance of hunched shoulders and upper back second to the pool and strain of the patient's large breasts.     DATA:    Radiology Review:  Pending/  Breast Measurements were taken and are noted in the media section.     IMPRESSION/RECOMMENDATIONS:       Diagnosis  -) Bilateral Macromastia  -) Bilateral Inframammary fold Pruritis  -) Neck Pain/Back Pain     -Breast Measurements obtained and will be scanned into media. Photos were obtained. Chaperone present  -Based on the patients history, ROS, and PE, It is in my medical opinion the patients symptomology is directly correlated to the size, mass and weight of her bilateralbreast tissue. Because this patient has tried and failed conservative treatment for the relief of her symptoms, The patient would benefit from a bilateral breast reduction.      - We will have the patient arrange a release of medical records from her PCP to obtain clinical history.  - Educated the patient on performing self breast exams as patient states she does not.      - Discussed with the patient on healthy diet and exercise for continued BMI reduction.   - Screening Mammogram will need reviewed as she had this done last week.        Proposed 900 gram removal of the Left breast  Proposed 900 gram removal of the Right breast     The perioperative course was discussed with the patient. The risks and benefits, including but not limited to, bleeding, infection, pain, scarring, asymmetry, loss of tissue, including nipple and areola, unsatisfactory appearance, decreased or increased nipple sensation, poor healing, inability to lactate as well as the need for further surgery were discussed with the patient. She would like to proceed.      The risks, benefits and options were discussed with the pt.  The risks included but not limited to pain, bleeding, infection, heavy scarring, damage to surrounding structures,  and need for further procedures. Other risks including but not limited to asymmetry, loss of nipple or/and areola, loss of sensation to nipple and areola, inability to breast feed, seroma, hematoma, implant failure, capsular contracture, and fat necrosis were also discussed with the patient. All of her questions were answered to her satisfaction and she agrees to proceed with the operation. Attestation    No change in patient's H & P. I have reviewed the procedure with the patient as well as the risk and benefits. They have no further questions and agree to proceed with surgery.     King Maria MD   7:27 AM  1/20/2022

## 2022-01-20 NOTE — OP NOTE
level of the inframammary fold which corresponded to 21 cm from the sternal notch. The nipple and areola were also resized to 38 mm. She was taken back to the operating room, placed in the supine position. SCDs were on and functioning at the time of induction of anesthesia. Preoperative antibiotics were given prior to incision. The area was prepped and draped in the usual sterile fashion with chlorhexidine prep stick following a wash with chlorhexidine Ricardo wipes. Tumecent infiltration was instilled with 500cc on each side and allowed to work. A 10-blade was used to score all incisions and the right pedicle was de-epithelialized with a Brown forceps and 10-blade and dissection was then carried circumferentially around the pedicle with PEAK PlasmaBlade with care to spare the inferior vascularized base. 2 cm thick cephalically-based skin flaps were then created, and with the aid of mastectomy hooks, were uniformly dissected down to the chest wall. The intervening tissue was removed from the patient and weighed. Further cephalic dissection was performed to allow for cephalic advancement of the inferior pedicle to prevent disrupting the optimal contour. Hemostasis was achieved with monopolar cautery. The wound was irrigated with normal saline. Topical Reed 5 gm was applied to each breast and the wound was closed with 3-0 Monocryl deep dermal, Insorb staples, and 3-0 Monocryl running subcuticular sutures. The reduction was performed in the exact same manner on the contralateral side with every attempt to myers symmetry. 0.25% Marcaine with 1:100,000 epinephrine was injected along the incision lines. Dermabond was applied followed by 1 inch Steri-Strips, Kerlix fluffs, and a surgical bra. She emerged from anesthesia without complication and taken to PACU in good condition.       Electronically signed by Norma Pike MD on 1/20/2022 at 12:00 PM

## 2022-01-20 NOTE — PROGRESS NOTES
Discharge instructions provided to patient, with  at bedside, written and verbal, patient verbalized understanding

## 2022-01-24 ENCOUNTER — TELEPHONE (OUTPATIENT)
Dept: SURGERY | Age: 47
End: 2022-01-24

## 2022-01-24 NOTE — TELEPHONE ENCOUNTER
Patient stated needs off work letter on her next visit. We can have this done for patient on this day, patient was notified.

## 2022-01-27 NOTE — PROGRESS NOTES
Subjective: Follow up today from bilateral breast reduction. Denies fever, nausea, vomiting, leg pain or swelling, pain is mild to moderate. She states she is taking her ABX as directed as well as analgesia PRN. She is wearing her surgical bra as directed. She voices no complaints. Objective:    Vitals:    01/31/22 1046   Pulse: 83   Temp: 97.4 °F (36.3 °C)   SpO2: 96%         Left Breast Wound: Clean, dry, and intact, no drainage. NAC with capillary refill intact. Appropriate level of edema and ecchymosis noted. flaps viable with good capillary refill at the area of trifurcation. Steri strips intact    Right Breast Wound: Clean, dry, and intact, no drainage. NAC with capillary refill intact. Appropriate level of edema and ecchymosis noted. flaps viable with good capillary refill at the area of trifurcation.   Steri strips intact      Assessment:    Patient Active Problem List   Diagnosis    Pre-operative laboratory examination       Pathology report[address]   Ting Eliass, [address] [place] [address]               FINAL SURGICAL PATHOLOGY REPORT     NAME:           Bekah Varela         Date of       01/20/2022                                            Collection:   Medical Record   [id number]              Date of       01/20/2022   Number:                                  Receipt:   Age:  38 Y        Sex:  F                Date          01/24/2022 11:42                                            Reported:   Date Of Birth:   1975   Financial        [id number]             Admitting     HAYLIE TAYLOR   Number:                                  Physician:   Patient          AZAEL MEADOWS         Ordering      HAYLIE TAYLOR   Location:                                Physician:     Accession Number:  OST-             Diagnosis:   A.  Right breast tissue, reduction mammoplasty: Benign breast tissue and   unremarkable skin     B.  Left breast tissue, reduction mammoplasty: Benign breast tissue and   unremarkable skin                                              Marget Border, M.D. Carlus Mcburney                                   (Electronic Signature)     Plan:     Status post bilateral breast reduction    Continue with analgesia PRN   ABX until completed  Surgical Bra at all times with padding PRN for comfort  No driving while taking narcotic pain medication or while UE ROM is limited. OK to begin B/L UE ROM exercises  OK to shower at this time. Advised the patient that they can allow soap and water to rinse of the incision site while showering. Once they are done in the shower they are to pat dry the incision site with a clean paper towel. No baths, hot tubs or soaking of the wound site at this time. Pt voices understanding. F/U in 2 weeks. Call office with concerns or signs of infection.     EDWIN Beltre

## 2022-01-31 ENCOUNTER — OFFICE VISIT (OUTPATIENT)
Dept: SURGERY | Age: 47
End: 2022-01-31

## 2022-01-31 VITALS — TEMPERATURE: 97.4 F | OXYGEN SATURATION: 96 % | HEART RATE: 83 BPM

## 2022-01-31 DIAGNOSIS — N62 MACROMASTIA: Primary | ICD-10-CM

## 2022-01-31 PROCEDURE — 99024 POSTOP FOLLOW-UP VISIT: CPT | Performed by: PHYSICIAN ASSISTANT

## 2022-02-09 NOTE — PROGRESS NOTES
Subjective: Follow up today from bilateral breast reduction. Denies fever, nausea, vomiting, leg pain or swelling, pain is absent. She presents today for continued wound examination. Objective: There were no vitals filed for this visit. Left Breast Wound: Clean, dry, and intact, no drainage. NAC with capillary refill intact. Appropriate level of edema and ecchymosis noted. flaps viable with good capillary refill at the area of trifurcation. Steri strips intact    Right Breast Wound: Clean, dry, and intact, no drainage. NAC with capillary refill intact. Appropriate level of edema and ecchymosis noted. flaps viable with good capillary refill at the area of trifurcation.   Steri strips intact      Assessment:    Patient Active Problem List   Diagnosis    Pre-operative laboratory examination       Pathology report- [address]            [address]   [address]            [address] [facility], [address] [place] [address], [address]                                                          [id number]               FINAL SURGICAL PATHOLOGY REPORT     NAME:           Kaila Gomes         Date of       01/20/2022                                            Collection:   Medical Record   [id number]              Date of       01/20/2022   Number:                                  Receipt:   Age:  38 Y        Sex:  F                Date          01/24/2022 11:42                                            Reported:   Date Of Birth:   1975   Financial        [id number]             Admitting     HAYLIE TAYLOR   Number:                                  Physician:   Patient          AZAEL MEADOWS         Ordering      HAYLIE TAYLOR   Location:                                Physician:     Accession Number:  [id number]             Diagnosis:   A.  Right breast tissue, reduction mammoplasty: Benign breast tissue and   unremarkable skin     B.  Left breast tissue, reduction mammoplasty: Benign breast tissue and   unremarkable skin                                              JANNET Zimmer                                   (Electronic Signature)     Plan:       Status post bilateral breast reduction. I informed the patient today that they can continue with a sports bra at this time until the 3-month postop janee where they can plan for fitting of a new bra. I explained to the patient that although their breasts have settled there may be continued swelling for several months. The patient may not see their final result of their breasts until upwards of 1 year postop. It is okay for the patient to continue with scar massage at this time as I have directed them. I informed the patient that they may have subtle changes to the breast as far as scar maturity and breast parenchyma changes. I advised the patient to bring any new masses or suspicious lesions to our office attention or their primary care physician as well as their OB/GYN. They will need to continue with regularly scheduled mammograms as seen fit by their age. Scar Care Instructions      Sunscreen Recommendations for Scars   We recommend that all patients use a sunscreen when outside but especially on new scars (that are exposed to sunlight) for a year after their procedure.  The SPF should be at least 28. Follow the application directions on the bottle. Why is it so Important to Use Sunscreen on Scars?  A scar is new and more fragile than the surrounding skin.  If you do not use sunscreen, the scar line will react differently to the sun than the surrounding skin.  If you don't use sunscreen, the scar tissue will become darker than surrounding skin.  This is a hyper-pigmented scar and will remain darker than the other skin.  After one year, the scar and surrounding skin should react equally to sun. Superficial Scar Massage   Scar massage desensitizes and reduces scar adhesions so skin glides freely.  Rub in a circular motion on and around the scar with firm, even pressure for 5 minutes four times per day    You can start scar massage once incision is completely healed and strong enough to handle the motion (usually 30 days post operatively).  You use lotion to do the scar massage to allow ease with motion over the scar and prevent friction at the area. Patient had no further questions. FU- PRN    Educate the patient contact office with any concerns or signs of infection moving forward.     EDWIN Oliveira

## 2022-02-14 ENCOUNTER — OFFICE VISIT (OUTPATIENT)
Dept: SURGERY | Age: 47
End: 2022-02-14

## 2022-02-14 VITALS — TEMPERATURE: 97.5 F

## 2022-02-14 DIAGNOSIS — N62 MACROMASTIA: Primary | ICD-10-CM

## 2022-02-14 PROCEDURE — 99024 POSTOP FOLLOW-UP VISIT: CPT | Performed by: PHYSICIAN ASSISTANT

## 2022-02-18 ENCOUNTER — TELEPHONE (OUTPATIENT)
Dept: SURGERY | Age: 47
End: 2022-02-18

## 2022-02-18 NOTE — TELEPHONE ENCOUNTER
Patient states can not go back to work on 16th, has too much pain, can not lift stretch or reach meters, needs more time off. Made appt for 2/21/22 to be evaulated.

## 2022-02-21 ENCOUNTER — OFFICE VISIT (OUTPATIENT)
Dept: SURGERY | Age: 47
End: 2022-02-21

## 2022-02-21 VITALS — TEMPERATURE: 97.2 F

## 2022-02-21 DIAGNOSIS — N62 MACROMASTIA: Primary | ICD-10-CM

## 2022-02-21 PROCEDURE — 99024 POSTOP FOLLOW-UP VISIT: CPT | Performed by: PHYSICIAN ASSISTANT

## 2022-02-28 ENCOUNTER — TELEPHONE (OUTPATIENT)
Dept: SURGERY | Age: 47
End: 2022-02-28

## 2022-02-28 NOTE — TELEPHONE ENCOUNTER
Pt called stating that the right breast has a small suture sticking out, it's irritating. No redness, discharge, warm to touch, fever. I reassured her that this to be expected and to call our office if she develops any redness, warm to touch, fever or discharge. She understood.

## (undated) DEVICE — Device

## (undated) DEVICE — 3M™ STERI-STRIP™ REINFORCED ADHESIVE SKIN CLOSURES, R1548, 1 IN X 5 IN (25 MM X 125 MM), 4 STRIPS/ENVELOPE: Brand: 3M™ STERI-STRIP™

## (undated) DEVICE — AGENT HEMSTAT 5GM ARISTA AH

## (undated) DEVICE — STERILE POLYISOPRENE POWDER-FREE SURGICAL GLOVES: Brand: PROTEXIS

## (undated) DEVICE — SOLUTION IV 1000ML LAC RINGERS PH 6.5 INJ USP VIAFLX PLAS

## (undated) DEVICE — NEEDLE HYPO 27GA L1.25IN GRY POLYPR HUB S STL REG BVL STR

## (undated) DEVICE — STAPLER SKIN L39MM DIA0.53MM CRWN 5.7MM S STL FIX HD PROX

## (undated) DEVICE — UNIVERSAL DRAPE: Brand: MEDLINE INDUSTRIES, INC.

## (undated) DEVICE — TUBING, SUCTION, 3/16" X 12', STRAIGHT: Brand: MEDLINE

## (undated) DEVICE — PLASMABLADE PS210-030S 3.0S LOCK: Brand: PLASMABLADE™

## (undated) DEVICE — APPLICATOR MEDICATED 26 CC SOLUTION HI LT ORNG CHLORAPREP

## (undated) DEVICE — TRAY PROCED PLASTIC CUSTOME SOFT TISS

## (undated) DEVICE — SPONGE LAP W18XL18IN WHT COT 4 PLY FLD STRUNG RADPQ DISP ST

## (undated) DEVICE — CLOTH SURG PREP PREOPERATIVE CHLORHEXIDINE GLUC 2% READYPREP

## (undated) DEVICE — STAPLER SKIN SQ 30 ABSRB STPL DISP INSORB

## (undated) DEVICE — INTENDED FOR TISSUE SEPARATION, AND OTHER PROCEDURES THAT REQUIRE A SHARP SURGICAL BLADE TO PUNCTURE OR CUT.: Brand: BARD-PARKER ® STAINLESS STEEL BLADES

## (undated) DEVICE — SYRINGE MED 10ML TRNSLUC BRL PLUNG BLK MRK POLYPR CTRL

## (undated) DEVICE — BANDAGE,GAUZE,4.5"X4.1YD,STERILE,LF: Brand: MEDLINE

## (undated) DEVICE — TUBING SUCTION 15 FRX3 MM 32 CM SIL

## (undated) DEVICE — ADHESIVE SKIN CLSR 0.7ML TOP DERMBND ADV

## (undated) DEVICE — SYRINGE IRRIG 60ML SFT PLIABLE BLB EZ TO GRP 1 HND USE W/